# Patient Record
Sex: FEMALE | Race: OTHER | Employment: PART TIME | URBAN - METROPOLITAN AREA
[De-identification: names, ages, dates, MRNs, and addresses within clinical notes are randomized per-mention and may not be internally consistent; named-entity substitution may affect disease eponyms.]

---

## 2019-03-11 ENCOUNTER — OFFICE VISIT (OUTPATIENT)
Dept: FAMILY MEDICINE CLINIC | Age: 24
End: 2019-03-11

## 2019-03-11 ENCOUNTER — HOSPITAL ENCOUNTER (OUTPATIENT)
Dept: LAB | Age: 24
Discharge: HOME | End: 2019-03-11
Payer: COMMERCIAL

## 2019-03-11 VITALS
SYSTOLIC BLOOD PRESSURE: 114 MMHG | WEIGHT: 137 LBS | RESPIRATION RATE: 16 BRPM | DIASTOLIC BLOOD PRESSURE: 76 MMHG | BODY MASS INDEX: 31.7 KG/M2 | TEMPERATURE: 97.9 F | OXYGEN SATURATION: 97 % | HEIGHT: 55 IN | HEART RATE: 71 BPM

## 2019-03-11 DIAGNOSIS — R42 DIZZINESS: ICD-10-CM

## 2019-03-11 DIAGNOSIS — N92.6 IRREGULAR MENSES: ICD-10-CM

## 2019-03-11 DIAGNOSIS — R10.33 PERIUMBILICAL ABDOMINAL PAIN: ICD-10-CM

## 2019-03-11 DIAGNOSIS — Z11.3 SCREEN FOR STD (SEXUALLY TRANSMITTED DISEASE): Primary | ICD-10-CM

## 2019-03-11 DIAGNOSIS — Z11.3 SCREEN FOR STD (SEXUALLY TRANSMITTED DISEASE): ICD-10-CM

## 2019-03-11 DIAGNOSIS — R51.9 NONINTRACTABLE EPISODIC HEADACHE, UNSPECIFIED HEADACHE TYPE: ICD-10-CM

## 2019-03-11 LAB
ALBUMIN SERPL-MCNC: 4.1 G/DL (ref 3.4–5)
ALBUMIN/GLOB SERPL: 1 {RATIO} (ref 0.8–1.7)
ALP SERPL-CCNC: 87 U/L (ref 45–117)
ALT SERPL-CCNC: 26 U/L (ref 13–56)
ANION GAP SERPL CALC-SCNC: 7 MMOL/L (ref 3–18)
AST SERPL-CCNC: 19 U/L (ref 15–37)
BASOPHILS # BLD: 0.1 K/UL (ref 0–0.1)
BASOPHILS NFR BLD: 1 % (ref 0–2)
BILIRUB SERPL-MCNC: 0.6 MG/DL (ref 0.2–1)
BUN SERPL-MCNC: 12 MG/DL (ref 7–18)
BUN/CREAT SERPL: 16 (ref 12–20)
CALCIUM SERPL-MCNC: 8.9 MG/DL (ref 8.5–10.1)
CHLORIDE SERPL-SCNC: 102 MMOL/L (ref 100–108)
CO2 SERPL-SCNC: 29 MMOL/L (ref 21–32)
CREAT SERPL-MCNC: 0.74 MG/DL (ref 0.6–1.3)
DIFFERENTIAL METHOD BLD: ABNORMAL
EOSINOPHIL # BLD: 0.1 K/UL (ref 0–0.4)
EOSINOPHIL NFR BLD: 1 % (ref 0–5)
ERYTHROCYTE [DISTWIDTH] IN BLOOD BY AUTOMATED COUNT: 14.2 % (ref 11.6–14.5)
GLOBULIN SER CALC-MCNC: 4.2 G/DL (ref 2–4)
GLUCOSE SERPL-MCNC: 86 MG/DL (ref 74–99)
HCT VFR BLD AUTO: 42.2 % (ref 35–45)
HGB BLD-MCNC: 13.5 G/DL (ref 12–16)
LYMPHOCYTES # BLD: 3.2 K/UL (ref 0.9–3.6)
LYMPHOCYTES NFR BLD: 35 % (ref 21–52)
MCH RBC QN AUTO: 24.2 PG (ref 24–34)
MCHC RBC AUTO-ENTMCNC: 32 G/DL (ref 31–37)
MCV RBC AUTO: 75.6 FL (ref 74–97)
MONOCYTES # BLD: 0.4 K/UL (ref 0.05–1.2)
MONOCYTES NFR BLD: 4 % (ref 3–10)
NEUTS SEG # BLD: 5.3 K/UL (ref 1.8–8)
NEUTS SEG NFR BLD: 59 % (ref 40–73)
PLATELET # BLD AUTO: 402 K/UL (ref 135–420)
PMV BLD AUTO: 9.9 FL (ref 9.2–11.8)
POTASSIUM SERPL-SCNC: 3.9 MMOL/L (ref 3.5–5.5)
PROT SERPL-MCNC: 8.3 G/DL (ref 6.4–8.2)
RBC # BLD AUTO: 5.58 M/UL (ref 4.2–5.3)
SODIUM SERPL-SCNC: 138 MMOL/L (ref 136–145)
T4 FREE SERPL-MCNC: 1.2 NG/DL (ref 0.7–1.5)
TSH SERPL DL<=0.05 MIU/L-ACNC: 0.99 UIU/ML (ref 0.36–3.74)
WBC # BLD AUTO: 9.1 K/UL (ref 4.6–13.2)

## 2019-03-11 PROCEDURE — 84439 ASSAY OF FREE THYROXINE: CPT

## 2019-03-11 PROCEDURE — 84443 ASSAY THYROID STIM HORMONE: CPT

## 2019-03-11 PROCEDURE — 87491 CHLMYD TRACH DNA AMP PROBE: CPT

## 2019-03-11 PROCEDURE — 85025 COMPLETE CBC W/AUTO DIFF WBC: CPT

## 2019-03-11 PROCEDURE — 36415 COLL VENOUS BLD VENIPUNCTURE: CPT

## 2019-03-11 PROCEDURE — 80053 COMPREHEN METABOLIC PANEL: CPT

## 2019-03-11 RX ORDER — BUTALBITAL, ACETAMINOPHEN AND CAFFEINE 50; 325; 40 MG/1; MG/1; MG/1
1 TABLET ORAL
Qty: 20 TAB | Refills: 0 | Status: SHIPPED | OUTPATIENT
Start: 2019-03-11

## 2019-03-11 NOTE — PROGRESS NOTES
Subjective:     HPI:  Joshua Chakraborty is a new patient who presents today to establish care presents with her friend Sadia Lewis who is also my patient. Medical problems: deafness. 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 Last visit with PCP: age 23  Last labs: unsure  Last PAP: never done  Hx of abnormal pap smears: never done  LMP: 03/04/19 (approximate)   Family history of GYN cancer: none  Family history of breast cancer: none  Last mammogram: never  Family history of colon cancer: none  Last colonoscopy: never    Of note patient can lip read spoken [place] and [place]. She also signs [place] Sign Language and American Sign Language. She did talk but She reports she was talking but stopped once she lost her hearing. She went to [employer] for [employer]. Abdominal pain: Pt complains of pain in her middle abdomen. She states that the pain is worst  in the morning. She has not noticed any food that makes it worse. Pt reports that beef makes her nauseous. All other meats are fine. No pain with urination. No pain with bowel movements. Menstruation: Pt complains of pain during menstruation. She menstruates every month for 3 days. She changes her sanitary pad 3 times per day on her day of heaviest bleeding. During her period, she experiences menstrual cramping in lower abdomen and lower back and a headache. Pt complains of some bleeding in between periods. The discharge is pink-concepcion. Headaches: Pt complains of left-sided headaches. She states that they make it difficult to sleep. She has been given medication in the past which made the headaches worse. She took it for 1 week then stopped. She cannot remember the name of the medication. She has not tried Tylenol. She reports that she sometimes takes Advil, which helps for about 2.5 hours. TMJ: Pt was diagnosed with TMJ. She was told not to eat ice. Pt requests STD testing.  She reports her first intimate contact was at 23 years old and then she waited 4 years and was recently intimate with a new partner within the last month. She states she did use a condom but there was one time that she did not. Urine sample taken today. Blood work ordered today. Not on File    Past Medical History:   Diagnosis Date    Deaf, nonspeaking         History reviewed. No pertinent surgical history. No family history on file. Social History     Socioeconomic History    Marital status: SINGLE     Spouse name: Not on file    Number of children: Not on file    Years of education: Not on file    Highest education level: Not on file   Social Needs    Financial resource strain: Not on file    Food insecurity - worry: Not on file    Food insecurity - inability: Not on file    Transportation needs - medical: Not on file   [employer] needs - non-medical: Not on file   Occupational History    Not on file   Tobacco Use    Smoking status: Never Smoker    Smokeless tobacco: Never Used   Substance and Sexual Activity    Alcohol use: No     Frequency: Never    Drug use: No    Sexual activity: Yes     Partners: Male     Birth control/protection: Condom, None   Other Topics Concern    Not on file   Social History Narrative    Not on file       REVIEW OF SYSTEM:  Review of Systems   Constitutional: Negative for chills and fever. Eyes: Negative for blurred vision. Respiratory: Negative for shortness of breath. Cardiovascular: Negative for chest pain, palpitations and leg swelling. Gastrointestinal: Positive for abdominal pain and nausea. Negative for constipation, diarrhea and vomiting. Musculoskeletal: Negative for joint pain. Neurological: Positive for headaches.        Objective:     Visit Vitals  /76 (BP 1 Location: Right arm, BP Patient Position: Sitting)   Pulse 71   Temp 97.9 °F (36.6 °C) (Oral)   Resp 16   Ht 4' 7\" (1.397 m)   Wt 137 lb (62.1 kg)   LMP 02/25/2019 (Exact Date)   SpO2 97%   BMI 31.84 kg/m² PHYSICAL EXAM:  Physical Exam   Constitutional: She is oriented to person, place, and time and well-developed, well-nourished, and in no distress. HENT:   Right Ear: Tympanic membrane, external ear and ear canal normal.   Left Ear: Tympanic membrane, external ear and ear canal normal.   Nose: Nose normal.   Mouth/Throat: Oropharynx is clear and moist.   Eyes: Pupils are equal, round, and reactive to light. Neck: Normal range of motion. Neck supple. No thyromegaly present. Cardiovascular: Normal rate, regular rhythm, normal heart sounds and intact distal pulses. No murmur heard. Pulmonary/Chest: Effort normal and breath sounds normal. She has no wheezes. Neurological: She is alert and oriented to person, place, and time. Skin: Skin is warm and dry. Vitals reviewed. Assessment/Plan:       ICD-10-CM ICD-9-CM    1. Screen for STD (sexually transmitted disease) Z11.3 V74.5 CT/NG/T.VAGINALIS AMPLIFICATION   2. Irregular menses N92.6 626.4 CBC WITH AUTOMATED DIFF      TSH 3RD GENERATION      T4, FREE   3. Nonintractable episodic headache, unspecified headache type R51 784.0 CBC WITH AUTOMATED DIFF      METABOLIC PANEL, COMPREHENSIVE      butalbital-acetaminophen-caffeine (FIORICET, ESGIC) -40 mg per tablet   4. Periumbilical abdominal pain C17.08 390.51 METABOLIC PANEL, COMPREHENSIVE   5. Dizziness R42 780.4 CBC WITH AUTOMATED DIFF     Patient given opportunity to ask questions. Questions answered. Patient understands plan of care. Follow-up Disposition:  Return in about 3 weeks (around 4/1/2019) for follow up medication. Written by Jonel Brown, as dictated by Pamela Greco DO.    I, Dr. Pamela Greco, confirm that all documentation is accurate.

## 2019-03-11 NOTE — PATIENT INSTRUCTIONS

## 2019-03-13 LAB
C TRACH RRNA SPEC QL NAA+PROBE: NEGATIVE
N GONORRHOEA RRNA SPEC QL NAA+PROBE: NEGATIVE
SPECIMEN SOURCE: NORMAL
T VAGINALIS RRNA VAG QL NAA+PROBE: NEGATIVE

## 2019-04-12 ENCOUNTER — HOSPITAL ENCOUNTER (OUTPATIENT)
Dept: LAB | Age: 24
Discharge: HOME | End: 2019-04-12
Payer: COMMERCIAL

## 2019-04-12 ENCOUNTER — OFFICE VISIT (OUTPATIENT)
Dept: FAMILY MEDICINE CLINIC | Age: 24
End: 2019-04-12

## 2019-04-12 VITALS
OXYGEN SATURATION: 99 % | BODY MASS INDEX: 31.94 KG/M2 | WEIGHT: 138 LBS | RESPIRATION RATE: 16 BRPM | DIASTOLIC BLOOD PRESSURE: 74 MMHG | SYSTOLIC BLOOD PRESSURE: 108 MMHG | TEMPERATURE: 97.9 F | HEART RATE: 73 BPM | HEIGHT: 55 IN

## 2019-04-12 DIAGNOSIS — Z11.3 SCREEN FOR STD (SEXUALLY TRANSMITTED DISEASE): ICD-10-CM

## 2019-04-12 DIAGNOSIS — Z12.4 SCREENING FOR CERVICAL CANCER: Primary | ICD-10-CM

## 2019-04-12 PROCEDURE — 88175 CYTOPATH C/V AUTO FLUID REDO: CPT

## 2019-04-12 PROCEDURE — 87798 DETECT AGENT NOS DNA AMP: CPT

## 2019-04-12 NOTE — PROGRESS NOTES
1. Have you been to the ER, urgent care clinic since your last visit? Hospitalized since your last visit? No    2. Have you seen or consulted any other health care providers outside of the [address] [place] [address] since your last visit? Include any pap smears or colon screening. No     Patient presents in office today for routine care.   Patient concerns: lab results, med eval

## 2019-04-12 NOTE — PROGRESS NOTES
Subjective:     HPI:  Tanisha Jenkins is a 21 y.o. female who presents to the office today for routine care and annual wellness exam.    LMP: Patient's last menstrual period was 03/24/2019.  03/24/19  Last PAP?: never  History of abnormal PAP: none  Age at menarche: 6  Age at first intercourse: 12   # lifetime partners: 3  # partners in last year: 2, sometimes used condoms  Concerns for STD?: yes  Abnormal vaginal discharge: 2 days ago had fishy odor  Family history for GYN cancer: none      Headache: Pt complains of headache when she sleeps on her left side. She wakes up every 2 hours due to the pain. Pt reports that she never got the Fioricet. Enuresis: Pt complains that she urinates in her bed at night. She states that it does not occur every night. Labs reviewed. Current Outpatient Medications   Medication Sig Dispense Refill    butalbital-acetaminophen-caffeine (FIORICET, ESGIC) -40 mg per tablet Take 1 Tab by mouth every six (6) hours as needed for Pain. 20 Tab 0        No Known Allergies    Past Medical History:   Diagnosis Date    Deaf, nonspeaking         History reviewed. No pertinent surgical history. History reviewed. No pertinent family history.     Social History     Socioeconomic History    Marital status: SINGLE     Spouse name: Not on file    Number of children: Not on file    Years of education: Not on file    Highest education level: Not on file   Occupational History    Not on file   Social Needs    Financial resource strain: Not on file    Food insecurity:     Worry: Not on file     Inability: Not on file    Transportation needs:     Medical: Not on file     Non-medical: Not on file   Tobacco Use    Smoking status: Never Smoker    Smokeless tobacco: Never Used   Substance and Sexual Activity    Alcohol use: No     Frequency: Never    Drug use: No    Sexual activity: Yes     Partners: Male     Birth control/protection: Condom, None   Lifestyle    Physical activity:     Days per week: Not on file     Minutes per session: Not on file    Stress: Not on file   Relationships    Social connections:     Talks on phone: Not on file     Gets together: Not on file     Attends Congregation service: Not on file     Active member of club or organization: Not on file     Attends meetings of clubs or organizations: Not on file     Relationship status: Not on file    Intimate partner violence:     Fear of current or ex partner: Not on file     Emotionally abused: Not on file     Physically abused: Not on file     Forced sexual activity: Not on file   Other Topics Concern    Not on file   Social History Narrative    Not on file       REVIEW OF SYSTEM:  Review of Systems   Constitutional: Negative for chills and fever. Eyes: Negative for blurred vision. Respiratory: Negative for shortness of breath. Cardiovascular: Negative for chest pain, palpitations and leg swelling. Gastrointestinal: Negative for constipation, diarrhea, nausea and vomiting. Musculoskeletal: Negative for joint pain. Neurological: Positive for headaches. Objective:     Visit Vitals  /74 (BP 1 Location: Right arm, BP Patient Position: Sitting)   Pulse 73   Temp 97.9 °F (36.6 °C) (Oral)   Resp 16   Ht 4' 7\" (1.397 m)   Wt 138 lb (62.6 kg)   LMP 03/24/2019   SpO2 99%   BMI 32.07 kg/m²       PHYSICAL EXAM:  Physical Exam   Constitutional: She is oriented to person, place, and time and well-developed, well-nourished, and in no distress. HENT:   Right Ear: Tympanic membrane, external ear and ear canal normal.   Left Ear: Tympanic membrane, external ear and ear canal normal.   Nose: Nose normal.   Mouth/Throat: Oropharynx is clear and moist.   Eyes: Pupils are equal, round, and reactive to light. Neck: Normal range of motion. Neck supple. No thyromegaly present. Cardiovascular: Normal rate, regular rhythm, normal heart sounds and intact distal pulses.    No murmur heard.  Pulmonary/Chest: Effort normal and breath sounds normal. She has no wheezes. Neurological: She is alert and oriented to person, place, and time. Skin: Skin is warm and dry. Vitals reviewed. Pelvic exam: VULVA: normal appearing vulva with no masses, tenderness or lesions, VAGINA: vaginal discharge - white and milky, CERVIX: normal appearing cervix without discharge or lesions, nulliparous os, UTERUS: uterus is normal size, shape, consistency and nontender, ADNEXA: tenderness left, PAP: Pap smear done today. Lab Results   Component Value Date/Time    Sodium 138 03/11/2019 03:35 PM    Potassium 3.9 03/11/2019 03:35 PM    Chloride 102 03/11/2019 03:35 PM    CO2 29 03/11/2019 03:35 PM    Anion gap 7 03/11/2019 03:35 PM    Glucose 86 03/11/2019 03:35 PM    BUN 12 03/11/2019 03:35 PM    Creatinine 0.74 03/11/2019 03:35 PM    BUN/Creatinine ratio 16 03/11/2019 03:35 PM    GFR est AA >60 03/11/2019 03:35 PM    GFR est non-AA >60 03/11/2019 03:35 PM    Calcium 8.9 03/11/2019 03:35 PM    Bilirubin, total 0.6 03/11/2019 03:35 PM    AST (SGOT) 19 03/11/2019 03:35 PM    Alk.  phosphatase 87 03/11/2019 03:35 PM    Protein, total 8.3 (H) 03/11/2019 03:35 PM    Albumin 4.1 03/11/2019 03:35 PM    Globulin 4.2 (H) 03/11/2019 03:35 PM    A-G Ratio 1.0 03/11/2019 03:35 PM    ALT (SGPT) 26 03/11/2019 03:35 PM     Lab Results   Component Value Date/Time    WBC 9.1 03/11/2019 03:35 PM    HGB 13.5 03/11/2019 03:35 PM    HCT 42.2 03/11/2019 03:35 PM    PLATELET 259 74/97/3793 03:35 PM    MCV 75.6 03/11/2019 03:35 PM     Lab Results   Component Value Date/Time    TSH 0.99 03/11/2019 03:35 PM    T4, Free 1.2 03/11/2019 03:35 PM     Component Value Flag Ref Range Units Status   Source URINE      Final   C. trachomatis by FILIPE NEGATIVE    NEGATIVE   Final   N. gonorrhoeae by FILIPE NEGATIVE    NEGATIVE   Final       Assessment/Plan:       ICD-10-CM ICD-9-CM    1. Screening for cervical cancer Z12.4 V76.2 PAP IG, RFX HPV ASCU, 61&11,79([id number])   2. Screen for STD (sexually transmitted disease) Z11.3 V74.5 NUAB VAGINITIS PLUS     Patient given opportunity to ask questions. Questions answered. Patient understands plan of care. Written by Nila Lopes, as dictated by DO. JOSIAS Cortes, Dr. Ruel Sepulveda, confirm that all documentation is accurate.

## 2019-04-12 NOTE — PATIENT INSTRUCTIONS
Please stop by pharmacy to  medicine for your headache. Remember to take at night before you go to bed to help with the headache. Text me if it helps. I will text you if the test is abnormal for any vaginal infections.

## 2019-04-17 LAB
A VAGINAE DNA VAG QL NAA+PROBE: NORMAL SCORE
BVAB2 DNA VAG QL NAA+PROBE: NORMAL SCORE
C ALBICANS DNA VAG QL NAA+PROBE: NEGATIVE
C GLABRATA DNA VAG QL NAA+PROBE: NEGATIVE
C TRACH RRNA SPEC QL NAA+PROBE: NEGATIVE
MEGA1 DNA VAG QL NAA+PROBE: NORMAL SCORE
N GONORRHOEA RRNA SPEC QL NAA+PROBE: NEGATIVE
SPECIMEN SOURCE: NORMAL
T VAGINALIS RRNA SPEC QL NAA+PROBE: NEGATIVE

## 2019-04-22 ENCOUNTER — TELEPHONE (OUTPATIENT)
Dept: FAMILY MEDICINE CLINIC | Age: 24
End: 2019-04-22

## 2019-04-22 DIAGNOSIS — N76.0 BACTERIAL VAGINOSIS: Primary | ICD-10-CM

## 2019-04-22 DIAGNOSIS — B96.89 BACTERIAL VAGINOSIS: Primary | ICD-10-CM

## 2019-04-22 RX ORDER — METRONIDAZOLE 500 MG/1
500 TABLET ORAL 2 TIMES DAILY
Qty: 14 TAB | Refills: 0 | Status: SHIPPED | OUTPATIENT
Start: 2019-04-22 | End: 2019-04-29

## 2019-04-22 NOTE — PROGRESS NOTES
Abnormal PAP. Texted patient and made her aware to follow up in office for results. Will discuss referral to Ob/Gyn for colposcopy.

## 2019-06-28 ENCOUNTER — OFFICE VISIT (OUTPATIENT)
Dept: FAMILY MEDICINE CLINIC | Age: 24
End: 2019-06-28

## 2019-06-28 ENCOUNTER — HOSPITAL ENCOUNTER (OUTPATIENT)
Dept: LAB | Age: 24
Discharge: HOME | End: 2019-06-28
Payer: COMMERCIAL

## 2019-06-28 VITALS
HEIGHT: 55 IN | RESPIRATION RATE: 16 BRPM | BODY MASS INDEX: 31.24 KG/M2 | WEIGHT: 135 LBS | OXYGEN SATURATION: 98 % | HEART RATE: 71 BPM | SYSTOLIC BLOOD PRESSURE: 116 MMHG | DIASTOLIC BLOOD PRESSURE: 73 MMHG | TEMPERATURE: 97.5 F

## 2019-06-28 DIAGNOSIS — M25.562 CHRONIC PAIN OF LEFT KNEE: Primary | ICD-10-CM

## 2019-06-28 DIAGNOSIS — G89.29 CHRONIC PAIN OF LEFT KNEE: Primary | ICD-10-CM

## 2019-06-28 DIAGNOSIS — Z11.3 SCREEN FOR STD (SEXUALLY TRANSMITTED DISEASE): ICD-10-CM

## 2019-06-28 PROCEDURE — 87491 CHLMYD TRACH DNA AMP PROBE: CPT

## 2019-06-28 RX ORDER — NAPROXEN 500 MG/1
500 TABLET ORAL 2 TIMES DAILY WITH MEALS
Qty: 30 TAB | Refills: 1 | Status: SHIPPED | OUTPATIENT
Start: 2019-06-28

## 2019-06-28 NOTE — PROGRESS NOTES
1. Have you been to the ER, urgent care clinic since your last visit? Hospitalized since your last visit? No    2. Have you seen or consulted any other health care providers outside of the [address] [place] [address] since your last visit? Include any pap smears or colon screening. No     Patient presents in office today for routine care. Patient concerns: med refill, lab results.

## 2019-06-28 NOTE — PROGRESS NOTES
Subjective:     HPI:  Lluvia Musa is a 25 y.o. female who presents to the office today for routine follow up. BV: Pt tested positive for bacterial vaginosis in April. She has finished metronidazole. Reports while taking the medication she had some side effects different taste in her mouth but reports once she finished the medication all those symptoms went away. Concern for STD: She is requesting STD testing today. She complains of loss of feeling in her vagina. No vaginal discharge. Upon further questioning she states that  She does not enjoy sex she does report that she enjoys the hugging and kissing leading up to the encounter. Discussed with patient healthy relationships at length and that if she does not desire sex it is okay to say NO. Take the time to meet go to the movies, talk, get to know someone and speak up when she is not comfortable. Right knee pain: Pt complains of intermittent right knee pain. Pt had a large bruise on her right medial knee 2 weeks ago. She does not remember hitting her knee. Her last episode of knee pain was yesterday. She does not take any medication. Pt advised to take Tylenol or Ibuprofen as needed experiencing pain. Current Outpatient Medications   Medication Sig Dispense Refill    naproxen (NAPROSYN) 500 mg tablet Take 1 Tab by mouth two (2) times daily (with meals). 30 Tab 1    butalbital-acetaminophen-caffeine (FIORICET, ESGIC) -40 mg per tablet Take 1 Tab by mouth every six (6) hours as needed for Pain. 20 Tab 0        No Known Allergies    Past Medical History:   Diagnosis Date    Deaf, nonspeaking         History reviewed. No pertinent surgical history. History reviewed. No pertinent family history.     Social History     Socioeconomic History    Marital status: SINGLE     Spouse name: Not on file    Number of children: Not on file    Years of education: Not on file    Highest education level: Not on file   Occupational History    Not on file   Social Needs    Financial resource strain: Not on file    Food insecurity:     Worry: Not on file     Inability: Not on file    Transportation needs:     Medical: Not on file     Non-medical: Not on file   Tobacco Use    Smoking status: Never Smoker    Smokeless tobacco: Never Used   Substance and Sexual Activity    Alcohol use: No     Frequency: Never    Drug use: No    Sexual activity: Yes     Partners: Male     Birth control/protection: Condom, None   Lifestyle    Physical activity:     Days per week: Not on file     Minutes per session: Not on file    Stress: Not on file   Relationships    Social connections:     Talks on phone: Not on file     Gets together: Not on file     Attends [employer]ism service: Not on file     Active member of club or organization: Not on file     Attends meetings of clubs or organizations: Not on file     Relationship status: Not on file    Intimate partner violence:     Fear of current or ex partner: Not on file     Emotionally abused: Not on file     Physically abused: Not on file     Forced sexual activity: Not on file   Other Topics Concern    Not on file   Social History Narrative    Not on file       REVIEW OF SYSTEM:  Review of Systems   Constitutional: Negative for chills and fever. Eyes: Negative for blurred vision. Respiratory: Negative for shortness of breath. Cardiovascular: Negative for chest pain, palpitations and leg swelling. Gastrointestinal: Negative for constipation, diarrhea, nausea and vomiting. Musculoskeletal: Positive for joint pain (right knee). Neurological: Negative for headaches.        Objective:     Visit Vitals  /73 (BP 1 Location: Right arm, BP Patient Position: Sitting)   Pulse 71   Temp 97.5 °F (36.4 °C) (Oral)   Resp 16   Ht 4' 7\" (1.397 m)   Wt 135 lb (61.2 kg)   LMP 06/17/2019   SpO2 98%   BMI 31.38 kg/m²       PHYSICAL EXAM:  Physical Exam   Constitutional: She is oriented to person, place, and time and well-developed, well-nourished, and in no distress. HENT:   Right Ear: Tympanic membrane, external ear and ear canal normal.   Left Ear: Tympanic membrane, external ear and ear canal normal.   Nose: Nose normal.   Mouth/Throat: Oropharynx is clear and moist.   Eyes: Pupils are equal, round, and reactive to light. Neck: Normal range of motion. Neck supple. No thyromegaly present. Cardiovascular: Normal rate, regular rhythm, normal heart sounds and intact distal pulses. No murmur heard. Pulmonary/Chest: Effort normal and breath sounds normal. She has no wheezes. Musculoskeletal:        Right knee: Tenderness (top of the knee) found. Neurological: She is alert and oriented to person, place, and time. Skin: Skin is warm and dry. Vitals reviewed. Assessment/Plan:       ICD-10-CM ICD-9-CM    1. Chronic pain of left knee M25.562 719.46 naproxen (NAPROSYN) 500 mg tablet    G89.29 338.29    2. Screen for STD (sexually transmitted disease) Z11.3 V74.5 CT/NG/T.VAGINALIS AMPLIFICATION      CT/NG/T.VAGINALIS AMPLIFICATION     Patient given opportunity to ask questions. Questions answered. Prescription given to patient for right knee pain. Spent greater than 50% of today's visit face to face with counseling. Patient understands plan of care. Follow-up and Dispositions    · Return if symptoms worsen or fail to improve. Written by Gissell Thomson, as dictated by Riki Brice DO.    I, Dr. Riki Brice, confirm that all documentation is accurate.

## 2019-08-28 ENCOUNTER — OFFICE VISIT (OUTPATIENT)
Dept: FAMILY MEDICINE CLINIC | Age: 24
End: 2019-08-28

## 2019-08-28 ENCOUNTER — HOSPITAL ENCOUNTER (OUTPATIENT)
Dept: GENERAL RADIOLOGY | Age: 24
Discharge: HOME | End: 2019-08-28
Attending: NURSE PRACTITIONER
Payer: COMMERCIAL

## 2019-08-28 ENCOUNTER — HOSPITAL ENCOUNTER (OUTPATIENT)
Dept: LAB | Age: 24
Discharge: HOME | End: 2019-08-28
Payer: COMMERCIAL

## 2019-08-28 VITALS
BODY MASS INDEX: 32.44 KG/M2 | HEIGHT: 55 IN | SYSTOLIC BLOOD PRESSURE: 110 MMHG | HEART RATE: 114 BPM | DIASTOLIC BLOOD PRESSURE: 77 MMHG | RESPIRATION RATE: 18 BRPM | TEMPERATURE: 97.2 F | WEIGHT: 140.2 LBS | OXYGEN SATURATION: 99 %

## 2019-08-28 DIAGNOSIS — R05.9 COUGH: Primary | ICD-10-CM

## 2019-08-28 DIAGNOSIS — J30.9 ALLERGIC RHINITIS, UNSPECIFIED SEASONALITY, UNSPECIFIED TRIGGER: ICD-10-CM

## 2019-08-28 DIAGNOSIS — Z11.3 SCREENING FOR STD (SEXUALLY TRANSMITTED DISEASE): ICD-10-CM

## 2019-08-28 PROCEDURE — 71046 X-RAY EXAM CHEST 2 VIEWS: CPT

## 2019-08-28 PROCEDURE — 87491 CHLMYD TRACH DNA AMP PROBE: CPT

## 2019-08-28 RX ORDER — LORATADINE 10 MG/1
10 TABLET ORAL DAILY
Qty: 90 TAB | Refills: 4 | Status: SHIPPED | OUTPATIENT
Start: 2019-08-28 | End: 2020-01-27 | Stop reason: SDUPTHER

## 2019-08-28 NOTE — PROGRESS NOTES
1. Have you been to the ER, urgent care clinic since your last visit? Hospitalized since your last visit? No    2. Have you seen or consulted any other health care providers outside of the [address] [place] [address] since your last visit? Include any pap smears or colon screening.  No

## 2019-08-28 NOTE — PROGRESS NOTES
Subjective     Patient ID:  Burke Gonzalez is a 25 y.o. ( 1995) female who presents for the following:   Establish Care and Shortness of Breath (last week while hiking)      HPI   She presents with symptoms which started last week after hiking. She reports that touching her chest over the upper sternum caused her to cough. Chest is not painful but \"feels weird\" to touch. She denies trauma. Denies any strenuous exercise. This is the first time that she has had symptoms like this. It first occurred 5 days ago then resolved after about 20 minutes then occurred again 3 days ago and also resolved after 20 minutes. She reports that her chest still feels a little funny. Denies shortness of breath. She reports that she usually has environmental allergies in the spring but denies any symptoms currently. Requests STD screening. She denies symptoms but does report having unprotected sexual activity. Review of Systems   Constitutional: Negative for appetite change, chills, diaphoresis, fatigue and fever. HENT: Negative for congestion, ear discharge, ear pain, hearing loss, postnasal drip, rhinorrhea, sinus pressure, sinus pain, sneezing, sore throat and trouble swallowing. Eyes: Negative for discharge, redness and visual disturbance. Respiratory: Positive for cough. Negative for chest tightness, shortness of breath and wheezing. Cardiovascular: Negative for chest pain, palpitations and leg swelling. Gastrointestinal: Negative for abdominal pain, diarrhea, nausea and vomiting. Genitourinary: Negative for decreased urine volume. Musculoskeletal: Negative for myalgias, neck pain and neck stiffness. Skin: Negative for rash. Allergic/Immunologic: Negative for environmental allergies. Neurological: Negative for dizziness, light-headedness and headaches. Past Medical History, Past Surgery History, Allergies, Social History, and Family History were reviewed and updated. Past Medical History:   Diagnosis Date    Deaf, nonspeaking      No past surgical history on file. No family history on file. Social History     Socioeconomic History    Marital status: SINGLE     Spouse name: Not on file    Number of children: Not on file    Years of education: Not on file    Highest education level: Not on file   Occupational History    Not on file   Social Needs    Financial resource strain: Not on file    Food insecurity:     Worry: Not on file     Inability: Not on file    Transportation needs:     Medical: Not on file     Non-medical: Not on file   Tobacco Use    Smoking status: Never Smoker    Smokeless tobacco: Never Used   Substance and Sexual Activity    Alcohol use: No     Frequency: Never    Drug use: No    Sexual activity: Yes     Partners: Male     Birth control/protection: Condom, None   Lifestyle    Physical activity:     Days per week: Not on file     Minutes per session: Not on file    Stress: Not on file   Relationships    Social connections:     Talks on phone: Not on file     Gets together: Not on file     Attends Shinto service: Not on file     Active member of club or organization: Not on file     Attends meetings of clubs or organizations: Not on file     Relationship status: Not on file    Intimate partner violence:     Fear of current or ex partner: Not on file     Emotionally abused: Not on file     Physically abused: Not on file     Forced sexual activity: Not on file   Other Topics Concern    Not on file   Social History Narrative    Not on file     No Known Allergies  Current Outpatient Medications on File Prior to Visit   Medication Sig Dispense Refill    naproxen (NAPROSYN) 500 mg tablet Take 1 Tab by mouth two (2) times daily (with meals). 30 Tab 1    butalbital-acetaminophen-caffeine (FIORICET, ESGIC) -40 mg per tablet Take 1 Tab by mouth every six (6) hours as needed for Pain.  20 Tab 0     No current facility-administered medications on file prior to visit. Objective     Visit Vitals  /77 (BP 1 Location: Right arm, BP Patient Position: Sitting)   Pulse (!) 114   Temp 97.2 °F (36.2 °C) (Oral)   Resp 18   Ht 4' 7\" (1.397 m)   Wt 140 lb 3.2 oz (63.6 kg)   LMP 08/04/2019   SpO2 99%   BMI 32.59 kg/m²     Patient's last menstrual period was 08/04/2019. Physical Exam   Constitutional: She is oriented to person, place, and time. She appears well-developed and well-nourished. Non-toxic appearance. No distress. HENT:   Right Ear: Hearing, tympanic membrane and ear canal normal.   Left Ear: Hearing, tympanic membrane and ear canal normal.   Nose: Mucosal edema and rhinorrhea present. No sinus tenderness. Mouth/Throat: Oropharynx is clear and moist and mucous membranes are normal. No uvula swelling. No oropharyngeal exudate, posterior oropharyngeal edema, posterior oropharyngeal erythema or tonsillar abscesses. Eyes: Conjunctivae are normal. Right eye exhibits no discharge. Left eye exhibits no discharge. Neck: Neck supple. Cardiovascular: Normal rate, regular rhythm and normal heart sounds. No murmur heard. Pulmonary/Chest: Effort normal and breath sounds normal. No respiratory distress. She has no decreased breath sounds. She has no wheezes. She has no rhonchi. She has no rales. No chest tenderness; however, palpation of the upper sternum and upper ribs does immediately cause a dry cough. Lymphadenopathy:     She has no cervical adenopathy. Neurological: She is alert and oriented to person, place, and time. Skin: Skin is warm and dry. No rash noted. She is not diaphoretic. Psychiatric: She has a normal mood and affect. Assessment and Plan     1. Cough  Chest x-ray. Further plan after results. - XR CHEST PA LAT; Future    2. Allergic rhinitis, unspecified seasonality, unspecified trigger  Start Claritin. - loratadine (CLARITIN) 10 mg tablet; Take 1 Tab by mouth daily. Dispense: 90 Tab;  Refill: 4    3. Screening for STD (sexually transmitted disease)    - CT/NG/T.VAGINALIS AMPLIFICATION; Future  - CT/NG/T.VAGINALIS AMPLIFICATION    She is to seek care immediately if chest pain, shortness of breath, or dyspnea. Follow-up and Dispositions    · Return if symptoms worsen or fail to improve. Risks, benefits, and alternatives of the medications and treatment plan prescribed today were discussed, and patient expressed understanding. Printed after visit summary was given to patient and reviewed. All patient questions and concerns were addressed. Plan follow-up as discussed or as needed if any worsening symptoms or change in condition.            Signed electronically by Pavan Caicedo DNP, FNP-BC

## 2019-08-30 ENCOUNTER — TELEPHONE (OUTPATIENT)
Dept: FAMILY MEDICINE CLINIC | Age: 24
End: 2019-08-30

## 2019-09-24 ENCOUNTER — HOSPITAL ENCOUNTER (OUTPATIENT)
Dept: LAB | Age: 24
Discharge: HOME | End: 2019-09-24
Payer: COMMERCIAL

## 2019-09-24 ENCOUNTER — OFFICE VISIT (OUTPATIENT)
Dept: FAMILY MEDICINE CLINIC | Age: 24
End: 2019-09-24

## 2019-09-24 VITALS
OXYGEN SATURATION: 99 % | TEMPERATURE: 98.2 F | HEIGHT: 55 IN | HEART RATE: 90 BPM | RESPIRATION RATE: 20 BRPM | WEIGHT: 142.3 LBS | SYSTOLIC BLOOD PRESSURE: 104 MMHG | BODY MASS INDEX: 32.93 KG/M2 | DIASTOLIC BLOOD PRESSURE: 72 MMHG

## 2019-09-24 DIAGNOSIS — N76.0 ACUTE VAGINITIS: Primary | ICD-10-CM

## 2019-09-24 DIAGNOSIS — R05.3 PERSISTENT COUGH: ICD-10-CM

## 2019-09-24 DIAGNOSIS — N76.0 ACUTE VAGINITIS: ICD-10-CM

## 2019-09-24 PROCEDURE — 87389 HIV-1 AG W/HIV-1&-2 AB AG IA: CPT

## 2019-09-24 PROCEDURE — 86780 TREPONEMA PALLIDUM: CPT

## 2019-09-24 PROCEDURE — 86694 HERPES SIMPLEX NES ANTBDY: CPT

## 2019-09-24 PROCEDURE — 36415 COLL VENOUS BLD VENIPUNCTURE: CPT

## 2019-09-24 PROCEDURE — 87798 DETECT AGENT NOS DNA AMP: CPT

## 2019-09-24 RX ORDER — FLUCONAZOLE 150 MG/1
150 TABLET ORAL DAILY
Qty: 1 TAB | Refills: 0 | Status: SHIPPED | OUTPATIENT
Start: 2019-09-24 | End: 2019-09-25

## 2019-09-24 NOTE — PROGRESS NOTES
1. Have you been to the ER, urgent care clinic since your last visit? Hospitalized since your last visit? No    2. Have you seen or consulted any other health care providers outside of the [address] [place] [address] since your last visit? Include any pap smears or colon screening.  No

## 2019-09-24 NOTE — PROGRESS NOTES
Subjective     Patient ID:  Clyde Nichole is a 25 y.o. ( 1995) female who presents for the following:   Vaginal Itching      HPI   She presents with a 2-week history of vaginal itching and bumps on the labia. She reports white discharge although not sure if it is coming from the bumps or whether it is vaginal discharge. On 2019, chlamydia, gonorrhea, and trichomonas were negative. She states she is not sure if she has had any possible exposures to STDs since then. She continues with a dry cough intermittently. It occurs without provocation but also with pressure on the sternum. Chest x-ray was normal.      Review of Systems   Constitutional: Negative for appetite change, chills, diaphoresis, fatigue and fever. HENT: Negative for congestion, ear discharge, ear pain, hearing loss, postnasal drip, rhinorrhea, sinus pressure, sinus pain, sneezing, sore throat and trouble swallowing. Eyes: Negative for discharge, redness and visual disturbance. Respiratory: Positive for cough. Negative for chest tightness, shortness of breath and wheezing. Cardiovascular: Negative for chest pain. Gastrointestinal: Negative for abdominal pain, diarrhea, nausea and vomiting. Genitourinary: Positive for genital sores (\"Bumps\") and vaginal discharge (And itching). Negative for decreased urine volume, difficulty urinating, dyspareunia, dysuria, flank pain, frequency, hematuria, menstrual problem, pelvic pain, urgency, vaginal bleeding and vaginal pain. Musculoskeletal: Negative for myalgias, neck pain and neck stiffness. Skin: Negative for rash. Allergic/Immunologic: Negative for environmental allergies. Neurological: Negative for dizziness, light-headedness and headaches. Past Medical History, Past Surgery History, Allergies, Social History, and Family History were reviewed and updated.       Past Medical History:   Diagnosis Date    Deaf, nonspeaking      No past surgical history on file.  No family history on file. Social History     Socioeconomic History    Marital status: SINGLE     Spouse name: Not on file    Number of children: Not on file    Years of education: Not on file    Highest education level: Not on file   Occupational History    Not on file   Social Needs    Financial resource strain: Not on file    Food insecurity:     Worry: Not on file     Inability: Not on file    Transportation needs:     Medical: Not on file     Non-medical: Not on file   Tobacco Use    Smoking status: Never Smoker    Smokeless tobacco: Never Used   Substance and Sexual Activity    Alcohol use: No     Frequency: Never    Drug use: No    Sexual activity: Yes     Partners: Male     Birth control/protection: Condom, None   Lifestyle    Physical activity:     Days per week: Not on file     Minutes per session: Not on file    Stress: Not on file   Relationships    Social connections:     Talks on phone: Not on file     Gets together: Not on file     Attends Voodoo service: Not on file     Active member of club or organization: Not on file     Attends meetings of clubs or organizations: Not on file     Relationship status: Not on file    Intimate partner violence:     Fear of current or ex partner: Not on file     Emotionally abused: Not on file     Physically abused: Not on file     Forced sexual activity: Not on file   Other Topics Concern    Not on file   Social History Narrative    Not on file     No Known Allergies  Current Outpatient Medications on File Prior to Visit   Medication Sig Dispense Refill    butalbital-acetaminophen-caffeine (FIORICET, ESGIC) -40 mg per tablet Take 1 Tab by mouth every six (6) hours as needed for Pain. 20 Tab 0    loratadine (CLARITIN) 10 mg tablet Take 1 Tab by mouth daily. 90 Tab 4    naproxen (NAPROSYN) 500 mg tablet Take 1 Tab by mouth two (2) times daily (with meals).  30 Tab 1     No current facility-administered medications on file prior to visit. Objective     Visit Vitals  /72   Pulse 90   Temp 98.2 °F (36.8 °C) (Oral)   Resp 20   Ht 4' 7\" (1.397 m)   Wt 142 lb 4.8 oz (64.5 kg)   LMP 09/04/2019   SpO2 99%   BMI 33.07 kg/m²     Patient's last menstrual period was 09/04/2019. Physical Exam   Constitutional: She is oriented to person, place, and time. She appears well-developed and well-nourished. Non-toxic appearance. No distress. HENT:   Right Ear: Hearing, tympanic membrane and ear canal normal.   Left Ear: Hearing, tympanic membrane and ear canal normal.   Nose: No mucosal edema, rhinorrhea or sinus tenderness. Mouth/Throat: Oropharynx is clear and moist and mucous membranes are normal. No uvula swelling. No oropharyngeal exudate, posterior oropharyngeal edema, posterior oropharyngeal erythema or tonsillar abscesses. Eyes: Conjunctivae are normal. Right eye exhibits no discharge. Left eye exhibits no discharge. Neck: Neck supple. Cardiovascular: Normal rate, regular rhythm and normal heart sounds. No murmur heard. Pulmonary/Chest: Effort normal and breath sounds normal. No respiratory distress. She has no decreased breath sounds. She has no wheezes. She has no rhonchi. She has no rales. Dry cough intermittently during exam.  Dry cough was triggered by moderate pressure on the sternum. Abdominal: Soft. Normal appearance and bowel sounds are normal. She exhibits no distension, no fluid wave, no abdominal bruit, no ascites, no pulsatile midline mass and no mass. There is no hepatosplenomegaly. There is no tenderness. There is no rebound, no CVA tenderness, no tenderness at McBurney's point and negative Mueller's sign. No hernia. Genitourinary: Uterus normal. No labial fusion. There is lesion (2 small nontender, nondraining skin lesions consistent with sebaceous cysts) on the right labia. There is no rash, tenderness or injury on the right labia. There is no rash, tenderness, lesion or injury on the left labia.  Cervix exhibits no motion tenderness, no discharge and no friability. Right adnexum displays no mass, no tenderness and no fullness. Left adnexum displays no mass, no tenderness and no fullness. No erythema, tenderness or bleeding in the vagina. No foreign body in the vagina. No signs of injury around the vagina. Vaginal discharge (Thick, white) found. Lymphadenopathy:     She has no cervical adenopathy. Neurological: She is alert and oriented to person, place, and time. Skin: Skin is warm and dry. No rash noted. She is not diaphoretic. Psychiatric: She has a normal mood and affect. LABS     TESTS      Assessment and Plan     1. Acute vaginitis  We will treat empirically for yeast infection while pending results. Vaginal culture and blood work for STDs today. - fluconazole (DIFLUCAN) 150 mg tablet; Take 1 Tab by mouth daily for 1 day. FDA advises cautious prescribing of oral fluconazole in pregnancy. Dispense: 1 Tab; Refill: 0  - NUSWAB VAGINITIS PLUS; Future  - HIV 1/2 AB SCREEN W RFLX CONFIRM; Future  - T PALLIDUM AB; Future  - HSV 1/2 AB, IGG/IGM; Future    2. Persistent cough  Restart daily antihistamine for possible allergies. 2-week trial of Zantac 150 mg twice daily for possible acid reflux. Follow-up if symptoms continue. - PULMONARY FUNCTION TEST; Future          Risks, benefits, and alternatives of the medications and treatment plan prescribed today were discussed, and patient expressed understanding. Printed after visit summary was given to patient and reviewed. All patient questions and concerns were addressed. Plan follow-up as discussed or as needed if any worsening symptoms or change in condition.            Signed electronically by Yana Arteaga DNP, FNP-BC

## 2019-09-24 NOTE — PATIENT INSTRUCTIONS
Vaginitis: Care Instructions  Your Care Instructions    Vaginitis is soreness or infection of the vagina. This common problem can cause itching and burning. And it can cause a change in vaginal discharge. Sometimes it can cause pain during sex. Vaginitis may be caused by bacteria, yeast, or other germs. Some infections that cause it are caught from a sexual partner. Bath products, spermicides, and douches can irritate the vagina too. Some women have this problem during and after menopause. A drop in estrogen levels during this time can cause dryness, soreness, and pain during sex. Your doctor can give you medicine to treat an infection. And home care may help you feel better. For certain types of infections, your sex partner must be treated too. Follow-up care is a key part of your treatment and safety. Be sure to make and go to all appointments, and call your doctor if you are having problems. It's also a good idea to know your test results and keep a list of the medicines you take. How can you care for yourself at home? · If your doctor prescribed antibiotics, take them as directed. Do not stop taking them just because you feel better. You need to take the full course of antibiotics. · Take your medicines exactly as prescribed. Call your doctor if you think you are having a problem with your medicine. · Do not eat or drink anything that has alcohol if you are taking metronidazole (Flagyl). · If you have a yeast infection, use over-the-counter products as your doctor tells you to. Or take medicine your doctor prescribes exactly as directed. · Wash your vaginal area daily with water. You also can use a mild, unscented soap if you want. · Do not use scented bath products. And do not use vaginal sprays or douches. · Put a washcloth soaked in cool water on the area to relieve itching. Or you can take cool baths.   · If you have dryness because of menopause, use estrogen cream or pills that your doctor prescribes. · Ask your doctor about when it is okay to have sex. · Use a personal lubricant before sex if you have dryness. Examples are Astroglide, K-Y Jelly, and Wet Lubricant Gel. · Ask your doctor if your sex partner also needs treatment. When should you call for help? Call your doctor now or seek immediate medical care if:    · You have a fever and pelvic pain.    Watch closely for changes in your health, and be sure to contact your doctor if:    · You have bleeding other than your period.     · You do not get better as expected. Where can you learn more? Go to http://ainsley-jony.info/. Enter W286 in the search box to learn more about \"Vaginitis: Care Instructions. \"  Current as of: February 19, 2019  Content Version: 12.2  © 7204-4069 [employer]. Care instructions adapted under license by [employer] (which disclaims liability or warranty for this information). If you have questions about a medical condition or this instruction, always ask your healthcare professional. [address] any warranty or liability for your use of this information.

## 2019-09-25 LAB
HIV 1+2 AB+HIV1 P24 AG SERPL QL IA: NONREACTIVE
HIV12 RESULT COMMENT, HHIVC: NORMAL
HSV1 IGG SER IA-ACNC: 58.3 INDEX (ref 0–0.9)
HSV1+2 IGM SER IA-ACNC: <0.91 RATIO (ref 0–0.9)
HSV2 IGG SER IA-ACNC: <0.91 INDEX (ref 0–0.9)
T PALLIDUM AB SER QL IA: NONREACTIVE

## 2019-10-01 DIAGNOSIS — N76.0 BV (BACTERIAL VAGINOSIS): Primary | ICD-10-CM

## 2019-10-01 DIAGNOSIS — B96.89 BV (BACTERIAL VAGINOSIS): Primary | ICD-10-CM

## 2019-10-01 RX ORDER — METRONIDAZOLE 7.5 MG/G
1 GEL VAGINAL
Qty: 25 G | Refills: 0 | Status: SHIPPED | OUTPATIENT
Start: 2019-10-01 | End: 2019-10-06

## 2019-11-04 ENCOUNTER — OFFICE VISIT (OUTPATIENT)
Dept: FAMILY MEDICINE CLINIC | Age: 24
End: 2019-11-04

## 2019-11-04 DIAGNOSIS — Z23 ENCOUNTER FOR IMMUNIZATION: Primary | ICD-10-CM

## 2019-11-04 NOTE — PROGRESS NOTES
Evelyn Christian is a 25 y.o. female FOR FLU SHOT ONLY INJECTED IN RIGHT DELTOID PATIENT TOLERATED WELL

## 2019-12-16 ENCOUNTER — HOSPITAL ENCOUNTER (OUTPATIENT)
Dept: LAB | Age: 24
Discharge: HOME | End: 2019-12-16
Payer: COMMERCIAL

## 2019-12-16 ENCOUNTER — OFFICE VISIT (OUTPATIENT)
Dept: FAMILY MEDICINE CLINIC | Age: 24
End: 2019-12-16

## 2019-12-16 VITALS
WEIGHT: 140.8 LBS | OXYGEN SATURATION: 97 % | TEMPERATURE: 98.2 F | BODY MASS INDEX: 32.59 KG/M2 | SYSTOLIC BLOOD PRESSURE: 104 MMHG | DIASTOLIC BLOOD PRESSURE: 72 MMHG | HEIGHT: 55 IN | HEART RATE: 81 BPM | RESPIRATION RATE: 20 BRPM

## 2019-12-16 DIAGNOSIS — N89.8 VAGINAL DISCHARGE: ICD-10-CM

## 2019-12-16 DIAGNOSIS — N94.0 MITTELSCHMERZ PHENOMENON: Primary | ICD-10-CM

## 2019-12-16 DIAGNOSIS — R10.2 PELVIC PAIN: ICD-10-CM

## 2019-12-16 DIAGNOSIS — N94.0 MITTELSCHMERZ PHENOMENON: ICD-10-CM

## 2019-12-16 LAB
APPEARANCE UR: CLEAR
BILIRUB UR QL: NEGATIVE
COLOR UR: YELLOW
ERYTHROCYTE [DISTWIDTH] IN BLOOD BY AUTOMATED COUNT: 14.8 % (ref 11.6–14.5)
ERYTHROCYTE [SEDIMENTATION RATE] IN BLOOD: 13 MM/HR (ref 0–20)
GLUCOSE UR STRIP.AUTO-MCNC: NEGATIVE MG/DL
HCT VFR BLD AUTO: 39.2 % (ref 35–45)
HGB BLD-MCNC: 12.9 G/DL (ref 12–16)
HGB UR QL STRIP: NEGATIVE
KETONES UR QL STRIP.AUTO: NEGATIVE MG/DL
LEUKOCYTE ESTERASE UR QL STRIP.AUTO: NEGATIVE
MCH RBC QN AUTO: 23.8 PG (ref 24–34)
MCHC RBC AUTO-ENTMCNC: 32.9 G/DL (ref 31–37)
MCV RBC AUTO: 72.5 FL (ref 74–97)
NITRITE UR QL STRIP.AUTO: NEGATIVE
PH UR STRIP: 6.5 [PH] (ref 5–8)
PLATELET # BLD AUTO: 275 K/UL (ref 135–420)
PMV BLD AUTO: 10.2 FL (ref 9.2–11.8)
PROT UR STRIP-MCNC: NEGATIVE MG/DL
RBC # BLD AUTO: 5.41 M/UL (ref 4.2–5.3)
SP GR UR REFRACTOMETRY: 1.03 (ref 1–1.03)
UROBILINOGEN UR QL STRIP.AUTO: 1 EU/DL (ref 0.2–1)
WBC # BLD AUTO: 7.2 K/UL (ref 4.6–13.2)

## 2019-12-16 PROCEDURE — 86140 C-REACTIVE PROTEIN: CPT

## 2019-12-16 PROCEDURE — 85027 COMPLETE CBC AUTOMATED: CPT

## 2019-12-16 PROCEDURE — 87798 DETECT AGENT NOS DNA AMP: CPT

## 2019-12-16 PROCEDURE — 81003 URINALYSIS AUTO W/O SCOPE: CPT

## 2019-12-16 PROCEDURE — 36415 COLL VENOUS BLD VENIPUNCTURE: CPT

## 2019-12-16 PROCEDURE — 84703 CHORIONIC GONADOTROPIN ASSAY: CPT

## 2019-12-16 PROCEDURE — 85652 RBC SED RATE AUTOMATED: CPT

## 2019-12-16 NOTE — PROGRESS NOTES
Room #  5  Chief Complaint:  Vaginal cramps    HPI:    Aaliyah Watson is a 25 y.o. female who presents today for c/o Vaginal cramps and headaches almost everyday  1. Have you been to the ER, urgent care clinic since your last visit? Hospitalized since your last visit? NO    2. Have you seen or consulted any other health care providers outside of the [address] [place] [address] since your last visit? Include any pap smears or colon screening. NO  When :  Reason:    Health Maintenance reviewed Yes    Health Maintenance Due   Topic Date Due    HPV Age 9Y-34Y (1 - Female 2-dose series) 06/07/2006    DTaP/Tdap/Td series (1 - Tdap) 06/07/2006

## 2019-12-17 LAB — CRP SERPL-MCNC: 1.2 MG/DL (ref 0–0.3)

## 2019-12-18 LAB — HCG SERPL QL: NEGATIVE

## 2019-12-21 NOTE — PROGRESS NOTES
Impression / Plan     Diagnoses and all orders for this visit:    1. Mittelschmerz phenomenon  -     HCG QL SERUM; Future  -     CBC W/O DIFF; Future  -     SED RATE (ESR); Future  -     URINALYSIS W/ RFLX MICROSCOPIC; Future    2. Pelvic pain  -     US TRANSVAGINAL; Future    3. Vaginal discharge  -     NUSWAB VAGINITIS PLUS; Future    Plan: Suspect Radhachmerz regarding Pelvic Dysfunction, will obtain CBC, ESR/ CRP, BHCG, and UA, and obtain TVUS for further evaluation. Advise use of NSAID's in the meantime, may benefit from OCP.s. Vaginitis, suspect recurrence of BV, will send for Nuswab Plus for further evaluation, No clinical evidence for PID. Advise of Follow Up PAP Smear necessary 4/2020 for LGIL. Follow-up and Dispositions    · Return if symptoms worsen or fail to improve. KELLEN Nunez is a 25 y. o.female presenting for evaluation of Lower Abdominal Pain. Onset of symptoms Mid-cycle. Location mostly LLQ. No dysuria, frequency, or urgency. Positive vaginal discharge. No DUB. No diarrhea or constipation. Sexually active. No history of STDs. LMP 11/26/2019. No FEVER or chills. Currently No contraception. Last PAP Smear 4/12/2019 shows LGIL, Positive BV, No treatment. Concerns regarding Ovarian Cyst.     Review of Systems   Constitutional: Negative. HENT: Negative. Eyes: Negative. Respiratory: Negative. Cardiovascular: Negative. Gastrointestinal: Positive for abdominal pain. Genitourinary: Negative. Musculoskeletal: Positive for back pain. Skin: Negative. Neurological: Negative. Psychiatric/Behavioral: Negative. Medications     Outpatient Medications Prior to Visit   Medication Sig Dispense Refill    loratadine (CLARITIN) 10 mg tablet Take 1 Tab by mouth daily. 90 Tab 4    naproxen (NAPROSYN) 500 mg tablet Take 1 Tab by mouth two (2) times daily (with meals).  30 Tab 1    butalbital-acetaminophen-caffeine (FIORICET, ESGIC) -40 mg per tablet Take 1 Tab by mouth every six (6) hours as needed for Pain. 20 Tab 0     No facility-administered medications prior to visit. Allergies     No Known Allergies    Problem List     There are no active problems to display for this patient. Medical / Surgical / Family History     Past Medical History:   Diagnosis Date    Deaf, nonspeaking      History reviewed. No pertinent surgical history. History reviewed. No pertinent family history.     Social History     Social History     Socioeconomic History    Marital status: SINGLE     Spouse name: Not on file    Number of children: Not on file    Years of education: Not on file    Highest education level: Not on file   Occupational History    Not on file   Social Needs    Financial resource strain: Not on file    Food insecurity:     Worry: Not on file     Inability: Not on file    Transportation needs:     Medical: Not on file     Non-medical: Not on file   Tobacco Use    Smoking status: Never Smoker    Smokeless tobacco: Never Used   Substance and Sexual Activity    Alcohol use: No     Frequency: Never    Drug use: No    Sexual activity: Yes     Partners: Male     Birth control/protection: Condom, None   Lifestyle    Physical activity:     Days per week: Not on file     Minutes per session: Not on file    Stress: Not on file   Relationships    Social connections:     Talks on phone: Not on file     Gets together: Not on file     Attends Confucianist service: Not on file     Active member of club or organization: Not on file     Attends meetings of clubs or organizations: Not on file     Relationship status: Not on file    Intimate partner violence:     Fear of current or ex partner: Not on file     Emotionally abused: Not on file     Physically abused: Not on file     Forced sexual activity: Not on file   Other Topics Concern    Not on file   Social History Narrative    Not on file        ROS   Review of Systems    10 Element ROS negative unless specifically stated in History of Present Illness. Health Maintenance     Health Maintenance   Topic Date Due    HPV Age 9Y-34Y (1 - Female 2-dose series) 06/07/2006    DTaP/Tdap/Td series (1 - Tdap) 06/07/2006    PAP AKA CERVICAL CYTOLOGY  04/12/2022    Influenza Age 5 to Adult  Completed    Pneumococcal 0-64 years  Aged Out     Physical Exam   /72 (BP 1 Location: Left arm, BP Patient Position: Sitting)   Pulse 81   Temp 98.2 °F (36.8 °C) (Oral)   Resp 20   Ht 4' 7\" (1.397 m)   Wt 140 lb 12.8 oz (63.9 kg)   LMP 11/28/2019 (Exact Date)   SpO2 97%   BMI 32.73 kg/m²     Physical Exam  Constitutional:       Appearance: Normal appearance. She is normal weight. She is not ill-appearing. HENT:      Head: Normocephalic and atraumatic. Nose: Nose normal.      Mouth/Throat:      Mouth: Mucous membranes are moist.      Pharynx: Oropharynx is clear. Eyes:      Extraocular Movements: Extraocular movements intact. Conjunctiva/sclera: Conjunctivae normal.      Pupils: Pupils are equal, round, and reactive to light. Cardiovascular:      Rate and Rhythm: Normal rate and regular rhythm. Pulses: Normal pulses. Heart sounds: Normal heart sounds. No murmur. Pulmonary:      Effort: Pulmonary effort is normal. No respiratory distress. Breath sounds: Normal breath sounds. Abdominal:      General: Bowel sounds are normal.      Palpations: Abdomen is soft. There is no mass. Tenderness: There is tenderness. There is no guarding or rebound. Comments: LEFT Lower Abdominal Tenderness   Genitourinary:     General: Normal vulva. Vagina: Vaginal discharge present. Comments: No Adnexal Tenderness or Cervical Motion Tenderness on Bimanual Examination. No External Cervical or Vaginal masses. Positive milky white discharge. Skin:     General: Skin is warm and dry. Findings: No rash. Neurological:      General: No focal deficit present.       Mental Status: She is alert and oriented to person, place, and time. Mental status is at baseline. Psychiatric:         Mood and Affect: Mood normal.         Behavior: Behavior normal.         Thought Content:  Thought content normal.         Judgment: Judgment normal.       Ortho Exam    Edward Seals DO

## 2019-12-22 ENCOUNTER — TELEPHONE (OUTPATIENT)
Dept: FAMILY MEDICINE CLINIC | Age: 24
End: 2019-12-22

## 2019-12-22 DIAGNOSIS — N76.0 BV (BACTERIAL VAGINOSIS): Primary | ICD-10-CM

## 2019-12-22 DIAGNOSIS — B96.89 BV (BACTERIAL VAGINOSIS): Primary | ICD-10-CM

## 2019-12-22 DIAGNOSIS — R71.8 MICROCYTOSIS: ICD-10-CM

## 2019-12-22 RX ORDER — METRONIDAZOLE 500 MG/1
500 TABLET ORAL 2 TIMES DAILY
Qty: 14 TAB | Refills: 0 | Status: SHIPPED | OUTPATIENT
Start: 2019-12-22 | End: 2019-12-29

## 2019-12-22 NOTE — TELEPHONE ENCOUNTER
CRP-1.2, Positive BV, MCV-72.5, Normal H/H, recommend Metronidazole 500 mg BID for 7 Days, avoid ETOH, and obtain Ferritin, Iron Panel and Reticulocyte to evaluate Microcytosis, suspect TRANSIENT Iron Deficiency Anemia. Awaiting TVUS. See Orders. Thank you.

## 2019-12-24 ENCOUNTER — TELEPHONE (OUTPATIENT)
Dept: FAMILY MEDICINE CLINIC | Age: 24
End: 2019-12-24

## 2019-12-24 NOTE — TELEPHONE ENCOUNTER
Called patient to let her know that per Dr Nena Lynch her results are as follows:   CRP-1.2, Positive BV, MCV-72.5, Normal H/H,   recommend Metronidazole 500 mg BID for 7 Days, avoid ETOH, and obtain Ferritin, Iron Panel and Reticulocyte to evaluate Microcytosis, suspect TRANSIENT Iron Deficiency Anemia. Awaiting TVUS.    Left a message on vm for her to contact office

## 2020-01-06 ENCOUNTER — HOSPITAL ENCOUNTER (OUTPATIENT)
Dept: ULTRASOUND IMAGING | Age: 25
Discharge: HOME | End: 2020-01-06
Attending: INTERNAL MEDICINE
Payer: COMMERCIAL

## 2020-01-06 DIAGNOSIS — R10.2 PELVIC PAIN: ICD-10-CM

## 2020-01-06 PROCEDURE — 93975 VASCULAR STUDY: CPT

## 2020-01-07 ENCOUNTER — TELEPHONE (OUTPATIENT)
Dept: FAMILY MEDICINE CLINIC | Age: 25
End: 2020-01-07

## 2020-01-07 DIAGNOSIS — N92.1 MENORRHAGIA WITH IRREGULAR CYCLE: ICD-10-CM

## 2020-01-07 DIAGNOSIS — N94.0 MITTELSCHMERZ: Primary | ICD-10-CM

## 2020-01-07 NOTE — TELEPHONE ENCOUNTER
Called patient to let her know her lab results per dr Da Arevalo :   Normal TVUS. Recommend Follow up with GYN IF symptoms of Midcycle Pelvic Pain continues.  Left a vm on vm

## 2020-01-07 NOTE — TELEPHONE ENCOUNTER
Normal TVUS. Recommend Follow up with GYN IF symptoms of Midcycle Pelvic Pain continues. See Orders. Thanks.

## 2020-01-27 ENCOUNTER — OFFICE VISIT (OUTPATIENT)
Dept: FAMILY MEDICINE CLINIC | Age: 25
End: 2020-01-27

## 2020-01-27 VITALS
OXYGEN SATURATION: 98 % | WEIGHT: 143.2 LBS | HEART RATE: 71 BPM | HEIGHT: 55 IN | RESPIRATION RATE: 20 BRPM | SYSTOLIC BLOOD PRESSURE: 115 MMHG | TEMPERATURE: 98.7 F | DIASTOLIC BLOOD PRESSURE: 81 MMHG | BODY MASS INDEX: 33.14 KG/M2

## 2020-01-27 DIAGNOSIS — R10.2 PELVIC PAIN: ICD-10-CM

## 2020-01-27 DIAGNOSIS — N76.0 BV (BACTERIAL VAGINOSIS): ICD-10-CM

## 2020-01-27 DIAGNOSIS — Z79.1 NSAID LONG-TERM USE: ICD-10-CM

## 2020-01-27 DIAGNOSIS — J30.9 ALLERGIC RHINITIS, UNSPECIFIED SEASONALITY, UNSPECIFIED TRIGGER: Primary | ICD-10-CM

## 2020-01-27 DIAGNOSIS — R79.82 ELEVATED C-REACTIVE PROTEIN (CRP): ICD-10-CM

## 2020-01-27 DIAGNOSIS — B96.89 BV (BACTERIAL VAGINOSIS): ICD-10-CM

## 2020-01-27 DIAGNOSIS — D64.9 ANEMIA, UNSPECIFIED TYPE: ICD-10-CM

## 2020-01-27 RX ORDER — FAMOTIDINE 20 MG/1
20 TABLET, FILM COATED ORAL 2 TIMES DAILY
Qty: 28 TAB | Refills: 0 | Status: SHIPPED | OUTPATIENT
Start: 2020-01-27 | End: 2020-02-10

## 2020-01-27 RX ORDER — LORATADINE 10 MG/1
10 TABLET ORAL DAILY
Qty: 90 TAB | Refills: 3 | Status: SHIPPED | OUTPATIENT
Start: 2020-01-27

## 2020-01-27 NOTE — PATIENT INSTRUCTIONS
Stop NSAIDs (ibuprofen, advil, etc) Take tylenol as needed for pain. 650 mg up to four times a day as needed. Start pepcid twice a day for 2 weeks for stomach pain. Get metronidazole from your pharmacy and take it. (for bacterial vaginosis) Claritin for allergies.

## 2020-01-27 NOTE — PROGRESS NOTES
Subjective     Patient ID:  Brandi Elias is a 25 y.o. ( 1995) female who presents for the following:   Results      HPI   She presents for follow-up and results. Pelvic pain:  Left pelvic pain x2-3 month. Yesterday was worse. LMP was 20, finished bleeding 20, then 20 pain was worse. Pain is throughout the day on and off. BM are normal. Urination is normal. Exercising but nothing different than usual.   Pelvic ultrasound on 2019 was normal. She was treated for BV last month. Took plan B 3 weeks ago. Headaches:  Headaches daily, on and off, advil and tylenol help. Takes advil twice a day every day. Nasal congestion:  On and off for the last few months associated with eye watering. Also reports lower energy than usual.  Last lab work showed anemia and elevated CRP. Additional lab work including iron studies have been ordered. Sometimes when eating beef and watermelon, she has itching and throat feels funny. Listed as an allergy. Review of Systems   Constitutional: Positive for fatigue. Negative for appetite change, diaphoresis and unexpected weight change. Eyes: Negative for visual disturbance. Respiratory: Negative for cough, chest tightness and shortness of breath. Cardiovascular: Negative for chest pain, palpitations and leg swelling. Gastrointestinal: Positive for abdominal pain. Negative for abdominal distention, blood in stool, constipation, diarrhea, nausea, rectal pain and vomiting. Endocrine: Negative for polydipsia, polyphagia and polyuria. Genitourinary: Positive for pelvic pain. Negative for decreased urine volume, dysuria, frequency, menstrual problem, vaginal bleeding, vaginal discharge and vaginal pain. Musculoskeletal: Negative for joint swelling and myalgias. Skin: Negative for rash and wound. Neurological: Negative for dizziness, weakness, light-headedness, numbness and headaches.    Psychiatric/Behavioral: Negative for dysphoric mood and sleep disturbance. The patient is not nervous/anxious. Past Medical History, Past Surgery History, Allergies, Social History, and Family History were reviewed and updated. Past Medical History:   Diagnosis Date    Deaf, nonspeaking      No past surgical history on file. No family history on file.   Social History     Socioeconomic History    Marital status: SINGLE     Spouse name: Not on file    Number of children: Not on file    Years of education: Not on file    Highest education level: Not on file   Occupational History    Not on file   Social Needs    Financial resource strain: Not on file    Food insecurity:     Worry: Not on file     Inability: Not on file    Transportation needs:     Medical: Not on file     Non-medical: Not on file   Tobacco Use    Smoking status: Never Smoker    Smokeless tobacco: Never Used   Substance and Sexual Activity    Alcohol use: No     Frequency: Never    Drug use: No    Sexual activity: Yes     Partners: Male     Birth control/protection: Condom, None   Lifestyle    Physical activity:     Days per week: Not on file     Minutes per session: Not on file    Stress: Not on file   Relationships    Social connections:     Talks on phone: Not on file     Gets together: Not on file     Attends Holiness service: Not on file     Active member of club or organization: Not on file     Attends meetings of clubs or organizations: Not on file     Relationship status: Not on file    Intimate partner violence:     Fear of current or ex partner: Not on file     Emotionally abused: Not on file     Physically abused: Not on file     Forced sexual activity: Not on file   Other Topics Concern    Not on file   Social History Narrative    Not on file     Allergies   Allergen Reactions    Beef Containing Products Itching    Watermelon Itching     Current Outpatient Medications on File Prior to Visit   Medication Sig Dispense Refill    naproxen (NAPROSYN) 500 mg tablet Take 1 Tab by mouth two (2) times daily (with meals). 30 Tab 1    butalbital-acetaminophen-caffeine (FIORICET, ESGIC) -40 mg per tablet Take 1 Tab by mouth every six (6) hours as needed for Pain. 20 Tab 0     No current facility-administered medications on file prior to visit. Objective     Visit Vitals  /81   Pulse 71   Temp 98.7 °F (37.1 °C) (Oral)   Resp 20   Ht 4' 7\" (1.397 m)   Wt 143 lb 3.2 oz (65 kg)   LMP 01/21/2020   SpO2 98%   BMI 33.28 kg/m²     Patient's last menstrual period was 01/21/2020. Physical Exam  Constitutional:       General: She is not in acute distress. Appearance: Normal appearance. She is well-developed. She is not toxic-appearing or diaphoretic. HENT:      Right Ear: Hearing, tympanic membrane and ear canal normal.      Left Ear: Hearing, tympanic membrane and ear canal normal.      Nose: Mucosal edema and rhinorrhea present. No nasal tenderness. Right Sinus: No maxillary sinus tenderness or frontal sinus tenderness. Left Sinus: No maxillary sinus tenderness or frontal sinus tenderness. Mouth/Throat:      Pharynx: No oropharyngeal exudate, posterior oropharyngeal erythema or uvula swelling. Tonsils: No tonsillar abscesses. Eyes:      General:         Right eye: No discharge. Left eye: No discharge. Conjunctiva/sclera: Conjunctivae normal.      Pupils: Pupils are equal, round, and reactive to light. Neck:      Musculoskeletal: Neck supple. Cardiovascular:      Rate and Rhythm: Normal rate and regular rhythm. Pulses: Normal pulses. Heart sounds: Normal heart sounds. No murmur. Pulmonary:      Effort: Pulmonary effort is normal. No respiratory distress. Breath sounds: Normal breath sounds. No decreased breath sounds, wheezing, rhonchi or rales. Abdominal:      General: Bowel sounds are normal. There is no distension. Palpations: Abdomen is soft. There is no mass. Tenderness: There is abdominal tenderness (Moderate) in the right lower quadrant and left lower quadrant. Lymphadenopathy:      Cervical: No cervical adenopathy. Skin:     General: Skin is warm and dry. Findings: No rash. Neurological:      Mental Status: She is alert and oriented to person, place, and time.            LABS   Component      Latest Ref Rng & Units 12/16/2019 12/16/2019 12/16/2019 12/16/2019           9:47 AM  9:47 AM  9:47 AM  9:47 AM   Color             Appearance             Specific gravity      1.005 - 1.030         pH (UA)      5.0 - 8.0         Protein      NEG mg/dL       Glucose      NEG mg/dL       Ketone      NEG mg/dL       Bilirubin      NEG         Blood      NEG         Urobilinogen      0.2 - 1.0 EU/dL       Nitrites      NEG         Leukocyte Esterase      NEG         WBC      4.6 - 13.2 K/uL       RBC      4.20 - 5.30 M/uL       HGB      12.0 - 16.0 g/dL       HCT      35.0 - 45.0 %       MCV      74.0 - 97.0 FL       MCH      24.0 - 34.0 PG       MCHC      31.0 - 37.0 g/dL       RDW      11.6 - 14.5 %       PLATELET      726 - 793 K/uL       MPV      9.2 - 11.8 FL       Source       VAGINAL      Atopobium vaginae      Score SEE COMMENT      BVAB 2      Score SEE COMMENT      Megasphaera 1      Score SEE COMMENT      C. albicans, FILIPE      NEGATIVE   NEGATIVE      C. glabrata, FILIPE      NEGATIVE   NEGATIVE      T. vaginalis, FILIPE      NEGATIVE   NEGATIVE      C. trachomatis, FILIPE      NEGATIVE   NEGATIVE      N. gonorrhoeae, FILIPE      NEGATIVE   NEGATIVE      HCG, Ql.      NEG    NEGATIVE     Sed rate, automated      0 - 20 mm/hr    13   C-Reactive protein      0 - 0.3 mg/dL   1.2 (H)      Component      Latest Ref Rng & Units 12/16/2019 12/16/2019           9:47 AM  9:47 AM   Color        YELLOW   Appearance        CLEAR   Specific gravity      1.005 - 1.030    1.027   pH (UA)      5.0 - 8.0    6.5   Protein      NEG mg/dL  NEGATIVE   Glucose      NEG mg/dL  NEGATIVE   Ketone NEG mg/dL  NEGATIVE   Bilirubin      NEG    NEGATIVE   Blood      NEG    NEGATIVE   Urobilinogen      0.2 - 1.0 EU/dL  1.0   Nitrites      NEG    NEGATIVE   Leukocyte Esterase      NEG    NEGATIVE   WBC      4.6 - 13.2 K/uL 7.2    RBC      4.20 - 5.30 M/uL 5.41 (H)    HGB      12.0 - 16.0 g/dL 12.9    HCT      35.0 - 45.0 % 39.2    MCV      74.0 - 97.0 FL 72.5 (L)    MCH      24.0 - 34.0 PG 23.8 (L)    MCHC      31.0 - 37.0 g/dL 32.9    RDW      11.6 - 14.5 % 14.8 (H)    PLATELET      886 - 835 K/uL 275    MPV      9.2 - 11.8 FL 10.2    Source           Atopobium vaginae      Score     BVAB 2      Score     Megasphaera 1      Score     C. albicans, FILIPE      NEGATIVE       C. glabrata, FILIPE      NEGATIVE       T. vaginalis, FILIPE      NEGATIVE       C. trachomatis, FILIPE      NEGATIVE       N. gonorrhoeae, FILIPE      NEGATIVE       HCG, Ql.      NEG       Sed rate, automated      0 - 20 mm/hr     C-Reactive protein      0 - 0.3 mg/dL       TESTS      Assessment and Plan     1. Allergic rhinitis, unspecified seasonality, unspecified trigger  Restart Claritin 10 mg daily. - loratadine (CLARITIN) 10 mg tablet; Take 1 Tab by mouth daily. Dispense: 90 Tab; Refill: 3    2. Elevated C-reactive protein (CRP)  Recheck  - C REACTIVE PROTEIN, QT; Future    3. NSAID long-term use  Stop Advil. Tylenol as needed for headaches. Start Pepcid twice a day for 2 weeks. Follow-up if abdominal pain continues. - famotidine (PEPCID) 20 mg tablet; Take 1 Tab by mouth two (2) times a day for 14 days. Dispense: 28 Tab; Refill: 0    4. BV (bacterial vaginosis)  Symptoms resolved. 5. Pelvic pain  - famotidine (PEPCID) 20 mg tablet; Take 1 Tab by mouth two (2) times a day for 14 days. Dispense: 28 Tab; Refill: 0    6. Anemia, unspecified type  Additional lab work as ordered by Dr. Mandie Jurado. Follow-up and Dispositions    · Return in about 2 weeks (around 2/10/2020) for Medication follow up.          Risks, benefits, and alternatives of the medications and treatment plan prescribed today were discussed, and patient expressed understanding. Printed after visit summary was given to patient and reviewed. All patient questions and concerns were addressed. Plan follow-up as discussed or as needed if any worsening symptoms or change in condition.            Signed electronically by Ayush Pulliam DNP, WAGNER-BC

## 2020-01-27 NOTE — PROGRESS NOTES
1. Have you been to the ER, urgent care clinic since your last visit? Hospitalized since your last visit? No    2. Have you seen or consulted any other health care providers outside of the [address] [place] [address] since your last visit? Include any pap smears or colon screening.  No

## 2020-01-28 LAB — CRP SERPL-MCNC: 3 MG/L (ref 0–10)

## 2020-04-16 ENCOUNTER — E-VISIT (OUTPATIENT)
Dept: FAMILY MEDICINE CLINIC | Age: 25
End: 2020-04-16

## 2020-04-23 ENCOUNTER — VIRTUAL VISIT (OUTPATIENT)
Dept: FAMILY MEDICINE CLINIC | Age: 25
End: 2020-04-23

## 2020-04-23 DIAGNOSIS — N94.9 GENITAL LESION, FEMALE: Primary | ICD-10-CM

## 2020-04-23 NOTE — PROGRESS NOTES
Agustín Pham is a 25 y.o. female evaluated via telephone on 4/23/2020. Consent:  She and/or health care decision maker is aware that she may receive a bill for this telephone service, depending on her insurance coverage, and has provided verbal consent to proceed: Yes    I was at home while conducting this encounter. Patient was at home. Other participants: . Documentation:  4 days ago found a bump at the opening of the vagina. No pain or other symptoms. Feels funny like when having a period. Also itchy red bumps on the breast.     Continues with chronic intermittent cough. Denies any other symptoms. Will need to see her in person to evaluate fully. Scheduled for tomorrow; therefore, this encounter will not be billed. I do not affirm (not billable) this is a Patient Initiated Episode with an Established Patient who has not had a related appointment within my department in the past 7 days or scheduled within the next 24 hours.       Note: not billable if this call serves to triage the patient into an appointment for the relevant concern      Usman Sy NP

## 2020-05-01 ENCOUNTER — OFFICE VISIT (OUTPATIENT)
Dept: FAMILY MEDICINE CLINIC | Age: 25
End: 2020-05-01

## 2020-05-01 ENCOUNTER — HOSPITAL ENCOUNTER (OUTPATIENT)
Dept: LAB | Age: 25
Discharge: HOME | End: 2020-05-01

## 2020-05-01 VITALS
SYSTOLIC BLOOD PRESSURE: 119 MMHG | DIASTOLIC BLOOD PRESSURE: 79 MMHG | HEIGHT: 55 IN | HEART RATE: 79 BPM | RESPIRATION RATE: 15 BRPM | WEIGHT: 140 LBS | TEMPERATURE: 99.1 F | OXYGEN SATURATION: 100 % | BODY MASS INDEX: 32.4 KG/M2

## 2020-05-01 DIAGNOSIS — R10.2 PELVIC PRESSURE IN FEMALE: Primary | ICD-10-CM

## 2020-05-01 DIAGNOSIS — D49.89 CIN (CONJUNCTIVAL INTRAEPITHELIAL NEOPLASIA): ICD-10-CM

## 2020-05-01 DIAGNOSIS — R87.619 ABNORMAL CERVICAL PAPANICOLAOU SMEAR, UNSPECIFIED ABNORMAL PAP FINDING: ICD-10-CM

## 2020-05-01 DIAGNOSIS — Z11.3 ROUTINE SCREENING FOR STI (SEXUALLY TRANSMITTED INFECTION): ICD-10-CM

## 2020-05-01 LAB
BILIRUB UR QL STRIP: NEGATIVE
GLUCOSE UR-MCNC: NEGATIVE MG/DL
HCG URINE, QL. (POC): NEGATIVE
KETONES P FAST UR STRIP-MCNC: NEGATIVE MG/DL
PH UR STRIP: 5.5 [PH] (ref 4.6–8)
PROT UR QL STRIP: NEGATIVE
SP GR UR STRIP: 1.03 (ref 1–1.03)
UA UROBILINOGEN AMB POC: NORMAL (ref 0.2–1)
URINALYSIS CLARITY POC: CLEAR
URINALYSIS COLOR POC: YELLOW
URINE BLOOD POC: NEGATIVE
URINE LEUKOCYTES POC: NEGATIVE
URINE NITRITES POC: NEGATIVE
VALID INTERNAL CONTROL?: YES

## 2020-05-01 NOTE — PROGRESS NOTES
History of Present Illness  Dom Mayes is a 25 y.o. female who presents today for management of    Chief Complaint   Patient presents with    Pelvic Pain       Patient was accompanied by , Bibiana Garnett. Patient reports of a sensation of discomfort/pressure on her vagina. Onset was few weeks ago. She denies increased vaginal discharge, dyspareunia or abnormal vaginal bleeding. No urinary symptoms. She is sexually active with one partner. They do not use condoms. She denies prior history of STI but has had bacterial vaginosis in the past.    Current Medications  Current Outpatient Medications   Medication Sig    loratadine (CLARITIN) 10 mg tablet Take 1 Tab by mouth daily.  naproxen (NAPROSYN) 500 mg tablet Take 1 Tab by mouth two (2) times daily (with meals).  butalbital-acetaminophen-caffeine (FIORICET, ESGIC) -40 mg per tablet Take 1 Tab by mouth every six (6) hours as needed for Pain. No current facility-administered medications for this visit. Allergies/Drug Reactions  Allergies   Allergen Reactions    Beef Containing Products Itching    Watermelon Itching        Review of Systems  Review of Systems   Constitutional: Negative for chills, fever, malaise/fatigue and weight loss. Gastrointestinal: Negative for abdominal pain, blood in stool, melena, nausea and vomiting. Genitourinary: Negative for dysuria, frequency, hematuria and urgency. Musculoskeletal: Negative for back pain. Neurological: Negative for dizziness and headaches.         Physical Exam  Vital signs:   Vitals:    05/01/20 1433   BP: 119/79   Pulse: 79   Resp: 15   Temp: 99.1 °F (37.3 °C)   TempSrc: Oral   SpO2: 100%   Weight: 140 lb (63.5 kg)   Height: 4' 7\" (1.397 m)       General: alert, oriented, not in distress  Head: scalp normal, atraumatic  Pelvic exam: VULVA: normal appearing vulva with no masses, tenderness or lesions, VAGINA: normal appearing vagina with normal color and discharge, no lesions, CERVIX: hypopigmented nodule noted on the 4 o'clock position, (+) tenderness upon palpation, URETHRAL MEATUS: no erythema or lesions present, PERINEUM: no evidence of trauma, no rashes or skin lesions present, ANUS: within normal limits, no hemorrhoids present    POC Pregnancy test negative  POC Urinalysis normal    Assessment/Plan:        ICD-10-CM ICD-9-CM    1. Pelvic pressure in female R10.2 625.8 NUSWAB VAGINITIS PLUS      AMB POC URINALYSIS DIP STICK AUTO W/O MICRO      AMB POC URINE PREGNANCY TEST, VISUAL COLOR COMPARISON   2. Routine screening for STI (sexually transmitted infection) Z11.3 V74.5 NUSWAB VAGINITIS PLUS   3. 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 Referral to GYN entered. Also gave patient the phone number to make an appointment. Vaginal swab done. Will patient with results. Follow-up and Dispositions    · Return as needed. Total visit time was 25 minutes of which more than 50% was devoted to counseling and coordination of care. I have discussed the diagnosis with the patient and the intended plan as seen in the above orders. The patient has received an after-visit summary and questions were answered concerning future plans. I have discussed medication side effects and warnings with the patient as well. I have reviewed the plan of care with the patient, accepted their input and they are in agreement with the treatment goals.        Rudi Mane MD  May 1, 2020

## 2020-05-04 ENCOUNTER — HOSPITAL ENCOUNTER (OUTPATIENT)
Dept: LAB | Age: 25
Discharge: HOME | End: 2020-05-04
Payer: COMMERCIAL

## 2020-05-04 DIAGNOSIS — Z11.3 ROUTINE SCREENING FOR STI (SEXUALLY TRANSMITTED INFECTION): ICD-10-CM

## 2020-05-04 DIAGNOSIS — R10.2 PELVIC PRESSURE IN FEMALE: ICD-10-CM

## 2020-05-04 PROCEDURE — 87798 DETECT AGENT NOS DNA AMP: CPT

## 2020-05-08 ENCOUNTER — TELEPHONE (OUTPATIENT)
Dept: FAMILY MEDICINE CLINIC | Age: 25
End: 2020-05-08

## 2020-05-08 NOTE — TELEPHONE ENCOUNTER
Nurse attempted to reach patient concerning appt for   6/12/2020 12:20 PM Cee Mg MD   . Unable to reach patient. After third attempt, nurse will send letter to patient.
